# Patient Record
Sex: MALE | Race: BLACK OR AFRICAN AMERICAN | URBAN - METROPOLITAN AREA
[De-identification: names, ages, dates, MRNs, and addresses within clinical notes are randomized per-mention and may not be internally consistent; named-entity substitution may affect disease eponyms.]

---

## 2024-09-18 ENCOUNTER — ANESTHESIA EVENT (OUTPATIENT)
Dept: SURGERY | Facility: HOSPITAL | Age: 64
End: 2024-09-18
Payer: MEDICARE

## 2024-09-19 ENCOUNTER — ANESTHESIA (OUTPATIENT)
Dept: SURGERY | Facility: HOSPITAL | Age: 64
End: 2024-09-19
Payer: MEDICARE

## 2024-09-19 NOTE — ANESTHESIA PREPROCEDURE EVALUATION
PRE-OP EVALUATION    Patient Name: Que Shaikh    Admit Diagnosis: VOICE HOARSENESS; HEAVY SMOKER; VOCAL CORD MASS    Pre-op Diagnosis: VOICE HOARSENESS; HEAVY SMOKER; VOCAL CORD MASS    MICRODIRECT LARYNGOSCOPY WITH BIOPSY    Anesthesia Procedure: MICRODIRECT LARYNGOSCOPY WITH BIOPSY (Bilateral)    Surgeons and Role:     * Darron Colbert MD - Primary    Pre-op vitals reviewed.        Body mass index is 24.74 kg/m².    Current medications reviewed.  Hospital Medications:  No current facility-administered medications on file as of .       Outpatient Medications:     No medications prior to admission.       Allergies: Patient has no known allergies.      Anesthesia Evaluation        Anesthetic Complications           GI/Hepatic/Renal                                 Cardiovascular  Comment: Cardiac stress test 08/2024:  Impressions:   - There is no evidence of myocardial ischemia.   - Normal ejection fraction.   - Normal EKG study after maximal exercise.   - Study findings suggest moderate FIXED coronary artery disease,     predominantly involving the RCA. OR due to diaphragm attenuation.   Prepared and signed by   Atul Cartagena MD   08/28/2024 14:44       ECG reviewed.  Exercise tolerance: good              (+) hyperlipidemia                                  Endo/Other    Negative endo/other ROS.                              Pulmonary  Comment: Emphysema on CT scan; reviewed pulm note, no indication for active treatment at this time  50 pack year smoking, active smoker    Negative pulmonary ROS.                       Neuro/Psych                                      History reviewed. No pertinent surgical history.  Social History     Tobacco Use    Smoking status: Every Day     Types: Cigarettes    Smokeless tobacco: Never   Vaping Use    Vaping status: Never Used   Substance and Sexual Activity    Alcohol use: Yes     Comment: Occasional    Drug use: Not Currently     History   Drug Use Unknown     Available pre-op  labs reviewed.               Airway      Mallampati: II  Mouth opening: 3 FB  TM distance: 4 - 6 cm  Neck ROM: full Cardiovascular    Cardiovascular exam normal.         Dental    Dentition appears grossly intact         Pulmonary    Pulmonary exam normal.                 Other findings              ASA: 2   Plan: general  NPO status verified and patient meets guidelines.          Plan/risks discussed with: patient                Present on Admission:  **None**

## 2024-09-24 RX ORDER — ATORVASTATIN CALCIUM 20 MG/1
20 TABLET, FILM COATED ORAL NIGHTLY
COMMUNITY

## 2024-09-25 ENCOUNTER — HOSPITAL ENCOUNTER (OUTPATIENT)
Facility: HOSPITAL | Age: 64
Setting detail: HOSPITAL OUTPATIENT SURGERY
Discharge: HOME | End: 2024-09-25
Attending: OTOLARYNGOLOGY | Admitting: OTOLARYNGOLOGY
Payer: MEDICARE

## 2024-09-25 VITALS
WEIGHT: 166 LBS | HEART RATE: 72 BPM | BODY MASS INDEX: 24.04 KG/M2 | HEIGHT: 69.5 IN | DIASTOLIC BLOOD PRESSURE: 89 MMHG | OXYGEN SATURATION: 99 % | TEMPERATURE: 98 F | SYSTOLIC BLOOD PRESSURE: 136 MMHG | RESPIRATION RATE: 16 BRPM

## 2024-09-25 PROCEDURE — 88365 INSITU HYBRIDIZATION (FISH): CPT | Performed by: OTOLARYNGOLOGY

## 2024-09-25 PROCEDURE — 87176 TISSUE HOMOGENIZATION CULTR: CPT | Performed by: OTOLARYNGOLOGY

## 2024-09-25 PROCEDURE — 87206 SMEAR FLUORESCENT/ACID STAI: CPT | Performed by: OTOLARYNGOLOGY

## 2024-09-25 PROCEDURE — 0CBV8ZX EXCISION OF LEFT VOCAL CORD, VIA NATURAL OR ARTIFICIAL OPENING ENDOSCOPIC, DIAGNOSTIC: ICD-10-PCS | Performed by: OTOLARYNGOLOGY

## 2024-09-25 PROCEDURE — 88341 IMHCHEM/IMCYTCHM EA ADD ANTB: CPT | Performed by: OTOLARYNGOLOGY

## 2024-09-25 PROCEDURE — 0CBT8ZX EXCISION OF RIGHT VOCAL CORD, VIA NATURAL OR ARTIFICIAL OPENING ENDOSCOPIC, DIAGNOSTIC: ICD-10-PCS | Performed by: OTOLARYNGOLOGY

## 2024-09-25 PROCEDURE — 87185 SC STD ENZYME DETCJ PER NZM: CPT | Performed by: OTOLARYNGOLOGY

## 2024-09-25 PROCEDURE — 87070 CULTURE OTHR SPECIMN AEROBIC: CPT | Performed by: OTOLARYNGOLOGY

## 2024-09-25 PROCEDURE — 88342 IMHCHEM/IMCYTCHM 1ST ANTB: CPT | Performed by: OTOLARYNGOLOGY

## 2024-09-25 PROCEDURE — 87205 SMEAR GRAM STAIN: CPT | Performed by: OTOLARYNGOLOGY

## 2024-09-25 PROCEDURE — 87102 FUNGUS ISOLATION CULTURE: CPT | Performed by: OTOLARYNGOLOGY

## 2024-09-25 PROCEDURE — 87077 CULTURE AEROBIC IDENTIFY: CPT | Performed by: OTOLARYNGOLOGY

## 2024-09-25 PROCEDURE — 88321 CONSLTJ&REPRT SLD PREP ELSWR: CPT | Performed by: OTOLARYNGOLOGY

## 2024-09-25 PROCEDURE — 88305 TISSUE EXAM BY PATHOLOGIST: CPT | Performed by: OTOLARYNGOLOGY

## 2024-09-25 RX ORDER — LIDOCAINE HYDROCHLORIDE 10 MG/ML
INJECTION, SOLUTION EPIDURAL; INFILTRATION; INTRACAUDAL; PERINEURAL AS NEEDED
Status: DISCONTINUED | OUTPATIENT
Start: 2024-09-25 | End: 2024-09-25 | Stop reason: SURG

## 2024-09-25 RX ORDER — ONDANSETRON 2 MG/ML
INJECTION INTRAMUSCULAR; INTRAVENOUS AS NEEDED
Status: DISCONTINUED | OUTPATIENT
Start: 2024-09-25 | End: 2024-09-25 | Stop reason: SURG

## 2024-09-25 RX ORDER — NEOSTIGMINE METHYLSULFATE 1 MG/ML
INJECTION INTRAVENOUS AS NEEDED
Status: DISCONTINUED | OUTPATIENT
Start: 2024-09-25 | End: 2024-09-25 | Stop reason: SURG

## 2024-09-25 RX ORDER — ESMOLOL HYDROCHLORIDE 10 MG/ML
INJECTION INTRAVENOUS AS NEEDED
Status: DISCONTINUED | OUTPATIENT
Start: 2024-09-25 | End: 2024-09-25 | Stop reason: SURG

## 2024-09-25 RX ORDER — LABETALOL HYDROCHLORIDE 5 MG/ML
5 INJECTION, SOLUTION INTRAVENOUS EVERY 5 MIN PRN
Status: DISCONTINUED | OUTPATIENT
Start: 2024-09-25 | End: 2024-09-25

## 2024-09-25 RX ORDER — HYDROCODONE BITARTRATE AND ACETAMINOPHEN 5; 325 MG/1; MG/1
1 TABLET ORAL ONCE AS NEEDED
Status: DISCONTINUED | OUTPATIENT
Start: 2024-09-25 | End: 2024-09-25

## 2024-09-25 RX ORDER — HYDROCODONE BITARTRATE AND ACETAMINOPHEN 5; 325 MG/1; MG/1
2 TABLET ORAL ONCE AS NEEDED
Status: DISCONTINUED | OUTPATIENT
Start: 2024-09-25 | End: 2024-09-25

## 2024-09-25 RX ORDER — GLYCOPYRROLATE 0.2 MG/ML
INJECTION, SOLUTION INTRAMUSCULAR; INTRAVENOUS AS NEEDED
Status: DISCONTINUED | OUTPATIENT
Start: 2024-09-25 | End: 2024-09-25 | Stop reason: SURG

## 2024-09-25 RX ORDER — ONDANSETRON 2 MG/ML
4 INJECTION INTRAMUSCULAR; INTRAVENOUS EVERY 6 HOURS PRN
Status: DISCONTINUED | OUTPATIENT
Start: 2024-09-25 | End: 2024-09-25

## 2024-09-25 RX ORDER — LIDOCAINE HYDROCHLORIDE 40 MG/ML
SOLUTION TOPICAL AS NEEDED
Status: DISCONTINUED | OUTPATIENT
Start: 2024-09-25 | End: 2024-09-25 | Stop reason: SURG

## 2024-09-25 RX ORDER — ACETAMINOPHEN 500 MG
1000 TABLET ORAL ONCE AS NEEDED
Status: DISCONTINUED | OUTPATIENT
Start: 2024-09-25 | End: 2024-09-25

## 2024-09-25 RX ORDER — NALOXONE HYDROCHLORIDE 0.4 MG/ML
0.08 INJECTION, SOLUTION INTRAMUSCULAR; INTRAVENOUS; SUBCUTANEOUS AS NEEDED
Status: DISCONTINUED | OUTPATIENT
Start: 2024-09-25 | End: 2024-09-25

## 2024-09-25 RX ORDER — DEXAMETHASONE SODIUM PHOSPHATE 4 MG/ML
VIAL (ML) INJECTION AS NEEDED
Status: DISCONTINUED | OUTPATIENT
Start: 2024-09-25 | End: 2024-09-25 | Stop reason: SURG

## 2024-09-25 RX ORDER — SODIUM CHLORIDE, SODIUM LACTATE, POTASSIUM CHLORIDE, CALCIUM CHLORIDE 600; 310; 30; 20 MG/100ML; MG/100ML; MG/100ML; MG/100ML
INJECTION, SOLUTION INTRAVENOUS CONTINUOUS
Status: DISCONTINUED | OUTPATIENT
Start: 2024-09-25 | End: 2024-09-25

## 2024-09-25 RX ORDER — HYDROMORPHONE HYDROCHLORIDE 1 MG/ML
0.6 INJECTION, SOLUTION INTRAMUSCULAR; INTRAVENOUS; SUBCUTANEOUS EVERY 5 MIN PRN
Status: DISCONTINUED | OUTPATIENT
Start: 2024-09-25 | End: 2024-09-25

## 2024-09-25 RX ORDER — SCOLOPAMINE TRANSDERMAL SYSTEM 1 MG/1
1 PATCH, EXTENDED RELEASE TRANSDERMAL ONCE
Status: DISCONTINUED | OUTPATIENT
Start: 2024-09-25 | End: 2024-09-25 | Stop reason: HOSPADM

## 2024-09-25 RX ORDER — ROCURONIUM BROMIDE 10 MG/ML
INJECTION, SOLUTION INTRAVENOUS AS NEEDED
Status: DISCONTINUED | OUTPATIENT
Start: 2024-09-25 | End: 2024-09-25 | Stop reason: SURG

## 2024-09-25 RX ORDER — HYDROMORPHONE HYDROCHLORIDE 1 MG/ML
0.4 INJECTION, SOLUTION INTRAMUSCULAR; INTRAVENOUS; SUBCUTANEOUS EVERY 5 MIN PRN
Status: DISCONTINUED | OUTPATIENT
Start: 2024-09-25 | End: 2024-09-25

## 2024-09-25 RX ORDER — METOCLOPRAMIDE HYDROCHLORIDE 5 MG/ML
10 INJECTION INTRAMUSCULAR; INTRAVENOUS EVERY 8 HOURS PRN
Status: DISCONTINUED | OUTPATIENT
Start: 2024-09-25 | End: 2024-09-25

## 2024-09-25 RX ORDER — ACETAMINOPHEN 500 MG
1000 TABLET ORAL ONCE
Status: DISCONTINUED | OUTPATIENT
Start: 2024-09-25 | End: 2024-09-25 | Stop reason: HOSPADM

## 2024-09-25 RX ORDER — OXYMETAZOLINE HYDROCHLORIDE 0.05 G/100ML
SPRAY NASAL AS NEEDED
Status: DISCONTINUED | OUTPATIENT
Start: 2024-09-25 | End: 2024-09-25 | Stop reason: HOSPADM

## 2024-09-25 RX ORDER — HYDROMORPHONE HYDROCHLORIDE 1 MG/ML
0.2 INJECTION, SOLUTION INTRAMUSCULAR; INTRAVENOUS; SUBCUTANEOUS EVERY 5 MIN PRN
Status: DISCONTINUED | OUTPATIENT
Start: 2024-09-25 | End: 2024-09-25

## 2024-09-25 RX ADMIN — SODIUM CHLORIDE, SODIUM LACTATE, POTASSIUM CHLORIDE, CALCIUM CHLORIDE: 600; 310; 30; 20 INJECTION, SOLUTION INTRAVENOUS at 11:55:00

## 2024-09-25 RX ADMIN — LIDOCAINE HYDROCHLORIDE 50 MG: 10 INJECTION, SOLUTION EPIDURAL; INFILTRATION; INTRACAUDAL; PERINEURAL at 11:59:00

## 2024-09-25 RX ADMIN — SODIUM CHLORIDE, SODIUM LACTATE, POTASSIUM CHLORIDE, CALCIUM CHLORIDE: 600; 310; 30; 20 INJECTION, SOLUTION INTRAVENOUS at 12:43:00

## 2024-09-25 RX ADMIN — LIDOCAINE HYDROCHLORIDE 4 ML: 40 SOLUTION TOPICAL at 12:35:00

## 2024-09-25 RX ADMIN — ROCURONIUM BROMIDE 30 MG: 10 INJECTION, SOLUTION INTRAVENOUS at 11:59:00

## 2024-09-25 RX ADMIN — NEOSTIGMINE METHYLSULFATE 4 MG: 1 INJECTION INTRAVENOUS at 12:33:00

## 2024-09-25 RX ADMIN — GLYCOPYRROLATE 0.6 MG: 0.2 INJECTION, SOLUTION INTRAMUSCULAR; INTRAVENOUS at 12:33:00

## 2024-09-25 RX ADMIN — ONDANSETRON 4 MG: 2 INJECTION INTRAMUSCULAR; INTRAVENOUS at 12:04:00

## 2024-09-25 RX ADMIN — DEXAMETHASONE SODIUM PHOSPHATE 8 MG: 4 MG/ML VIAL (ML) INJECTION at 12:04:00

## 2024-09-25 RX ADMIN — ESMOLOL HYDROCHLORIDE 30 MG: 10 INJECTION INTRAVENOUS at 12:07:00

## 2024-09-25 NOTE — DISCHARGE INSTRUCTIONS
Instructions after Vocal Cord Surgery  Darron Colbert MD   [phone]  Recovery  Within the first day after surgery, it is common to have a small amount of blood streaks in your saliva when coughing. This should resolve in the first 1-2 days. Your voice will likely be hoarse and even more so than prior to surgery. This is also normal. Please pay attention to your breathing. If you have any wheezing, chest tightness, shortness of breath or air hunger, please go to the emergency room. Please rest your voice for 72 hours. Use voice only when necessary and please do so quietly (within arm's distance) when talking is unavoidable.    Diet  Start with clear liquids.  You may advance your diet as tolerated.  You can eat and drink whatever you like.      Activity   Initially, start with light activity only.   No travel or long trips for two weeks after surgery.  Heavy lifting, straining and should be avoided during the first two weeks.  Thereafter, as you improve, you may gradually increase your level of activity.  You may return to work or school when you feel better. Do not be surprised if you tire more easily than usual; that is your body’s way of telling you that you should slow down.        Medications  Resume the medications you were taking prior to surgery. Pain following surgery is usually mild and readily controlled with over-the-counter medication such as Tylenol (acetaminophen).  Do not be afraid to take a pain pill if you are uncomfortable, especially when going to bed at night or awakening in the morning.      Concerns  Bleeding.  A small amount of bleeding may be normal.  Please call the office or come to the Emergency Room should you develop brisk, new bleeding, which does not stop after a few minutes of holding pressure.  Fever.  Any temperature above 101 degrees should be treated with acetaminophen (Tylenol).  If the temperature does not respond to Tylenol, please call the office.   Redness.  If  the area becomes red and warm to touch or wound edges separate or the sutures dislodge, please call the office.  Swelling and Pain. Some swelling is expected even 24 to 48 hours after surgery, If swelling increases after that time or if you experience increasing pain, please call the office    Follow Up  Any pathology reports are typically available by a week after your surgery. Please call the office at [phone] to schedule an appointment with Dr. Colbert in 1 week, if not already arranged.       Darron Colbert MD  Otolaryngology - Head & Neck Surgery  [facility]  [phone]

## 2024-09-25 NOTE — ANESTHESIA POSTPROCEDURE EVALUATION
[facility]    Que Shaikh  Patient Status:  Hospital Outpatient Surgery   Age/Gender 64 year old male MRN [id number]   Location [facility] POST ANESTHESIA CARE UNIT Attending Darron Colbert MD   Hosp Day # 0 PCP Aura Murray DO       Anesthesia Post-op Note    BILATERAL MICRODIRECT LARYNGOSCOPY WITH BIOPSY    Procedure Summary       Date: 09/25/24 Room / Location:  MAIN OR 06 / [facility] MAIN OR    Anesthesia Start: 1155 Anesthesia Stop: 1243    Procedure: BILATERAL MICRODIRECT LARYNGOSCOPY WITH BIOPSY (Bilateral) Diagnosis: (VOICE HOARSENESS; HEAVY SMOKER; VOCAL CORD MASS)    Surgeons: Darron Colbert MD Anesthesiologist: Vj Bro MD    Anesthesia Type: general ASA Status: 2            Anesthesia Type: general    Vitals Value Taken Time   /82 09/25/24 1243   Temp 97.4 09/25/24 1243   Pulse 82 09/25/24 1243   Resp 12 09/25/24 1243   SpO2 99 09/25/24 1243       Patient Location: PACU    Anesthesia Type: general    Airway Patency: patent and extubated    Postop Pain Control: adequate    Mental Status: preanesthetic baseline    Nausea/Vomiting: none    Cardiopulmonary/Hydration status: stable euvolemic    Complications: no apparent anesthesia related complications    Postop vital signs: stable    Dental Exam: Unchanged from Preop    Patient to be discharged from PACU when criteria met.

## 2024-09-25 NOTE — OPERATIVE REPORT
PATIENT NAME: Que Shaikh Jr.   MRN: [id number]   DATE OF OPERATION: 9/25/2024   [facility]    PREOPERATIVE DIAGNOSIS:    1. Bilateral true vocal cord lesion   2. Hoarseness   3. History of tobacco use  POSTOPERATIVE DIAGNOSIS:   Same  PROCEDURE:    1. Direct microlaryngoscopy with biopsy of bilateral true vocal cord lesion (CPT 76688)    SURGEON: Darron Colbert MD     ASSISTANT: None.     ANESTHESIA: General.     INDICATION: The patient is a 64 year old male who presents with the above diagnoses. Discussions were held with the patient regarding treatment options, including observation or surgery. The patient decided to go ahead with the procedure, giving written consent. he was eager to proceed.     FINDINGS: Bilateral edema and irregular true vocal cord mucosa, R>L. Extension to subglottic surface of true vocal cords bilaterally    DESCRIPTION OF PROCEDURE: The patient was identified in the preoperative holding area and again in the operating room. The patient was moved from the stretcher to the operating room table and turned over to Anesthesia for sedation and intubation. The patient was sedated and intubated without complication. A time-out was performed confirming the patient's name, age, date of birth, procedure, and allergies. The patient was then turned 90 degrees and turned over to the surgeon for the procedure.     Attention was turned to the direct laryngoscopy with biopsy. Oral and oropharyngeal palpation was performed.  A mouthguard was placed and a Dedo laryngoscope was introduced into the oral cavity. The right oral cavity, retromolar trigone, and oropharynx were examined. The laryngoscope was then repositioned and the right pharynx and pyriform sinus were evaluated. The scope was moved from right to left examining the base-of-tongue and vallecula, left pyriform, left pharynx, oropharynx, retromolar trigone and oral cavity. The scope was then re-introduced in the midline and the posterior  pharyngeal wall, arytenoids, false and true vocal cords, epiglottis, vallecula and the midline base-of-tongue. Finally it was positioned with a view of the bilateral vocal cord lesion and secured in place. Cupped forceps were used to take multiple biopsies of the lesion. Oxymetazoline pledgets were placed for hemostasis. Once hemostasis was achieved the pledgets and laryngoscope were removed.     The patient was then turned back 90 degrees and turned over to the anesthesiologist for wake-up. The patient woke up without complications. he was transferred from the operating room table to the stretcher and from the stretcher to the PACU in stable condition.     SPECIMENS:    1. Right true vocal cord lesion (sent for histology and tissue culture)   2. Left true vocal cord lesion    ESTIMATED BLOOD LOSS: minimal.     COMPLICATIONS: None.     PLAN: Patient will take pain medication as needed and follow up in 1 week for path results        Darron Colbert MD  Otolaryngology - Head & Neck Surgery  [facility]  [phone]

## 2024-09-25 NOTE — ANESTHESIA PROCEDURE NOTES
Airway  Date/Time: 9/25/2024 12:01 PM  Urgency: elective    Airway not difficult    General Information and Staff    Patient location during procedure: OR  Anesthesiologist: Vj Bro MD  Resident/CRNA: Joe Mckinley CRNA  Performed: CRNA   Performed by: Joe Mckinley CRNA  Authorized by: Vj Bro MD      Indications and Patient Condition  Indications for airway management: anesthesia  Spontaneous Ventilation: absent  Sedation level: deep  Preoxygenated: yes  Patient position: sniffing  MILS maintained throughout  Mask difficulty assessment: 1 - vent by mask  Planned trial extubation    Final Airway Details  Final airway type: endotracheal airway      Successful airway: ETT  Cuffed: yes   Successful intubation technique: direct laryngoscopy  Facilitating devices/methods: intubating stylet  Endotracheal tube insertion site: oral  Blade: Norman  Blade size: #4  ETT size (mm): 6.0    Cormack-Lehane Classification: grade I - full view of glottis  Placement verified by: capnometry   Cuff volume (mL): 6  Measured from: lips  ETT to lips (cm): 23  Number of attempts at approach: 1  Number of other approaches attempted: 0    Additional Comments  Dentition per pre op

## 2024-10-25 ENCOUNTER — ORDER TRANSCRIPTION (OUTPATIENT)
Dept: PHYSICAL THERAPY | Facility: HOSPITAL | Age: 64
End: 2024-10-25

## 2024-10-25 DIAGNOSIS — C32.0 VOCAL CORD CANCER (HCC): Primary | ICD-10-CM

## 2024-11-07 RX ORDER — BUPROPION HYDROCHLORIDE 150 MG/1
150 TABLET ORAL DAILY
Status: ON HOLD | COMMUNITY
End: 2024-11-20

## 2024-11-07 RX ORDER — DEXAMETHASONE 4 MG/1
4 TABLET ORAL
COMMUNITY

## 2024-11-07 RX ORDER — PROCHLORPERAZINE MALEATE 5 MG/1
10 TABLET ORAL EVERY 6 HOURS PRN
COMMUNITY

## 2024-11-07 RX ORDER — ACETAMINOPHEN 325 MG/1
325 TABLET ORAL EVERY 6 HOURS PRN
COMMUNITY

## 2024-11-07 RX ORDER — ONDANSETRON 8 MG/1
8 TABLET, FILM COATED ORAL EVERY 8 HOURS PRN
COMMUNITY

## 2024-11-20 ENCOUNTER — ANESTHESIA (OUTPATIENT)
Dept: ENDOSCOPY | Facility: HOSPITAL | Age: 64
End: 2024-11-20
Payer: MEDICARE

## 2024-11-20 ENCOUNTER — ANESTHESIA EVENT (OUTPATIENT)
Dept: ENDOSCOPY | Facility: HOSPITAL | Age: 64
End: 2024-11-20
Payer: MEDICARE

## 2024-11-20 ENCOUNTER — HOSPITAL ENCOUNTER (OUTPATIENT)
Facility: HOSPITAL | Age: 64
Setting detail: HOSPITAL OUTPATIENT SURGERY
Discharge: HOME | End: 2024-11-20
Attending: INTERNAL MEDICINE | Admitting: INTERNAL MEDICINE
Payer: MEDICARE

## 2024-11-20 VITALS
HEIGHT: 69.5 IN | WEIGHT: 166 LBS | BODY MASS INDEX: 24.04 KG/M2 | DIASTOLIC BLOOD PRESSURE: 84 MMHG | SYSTOLIC BLOOD PRESSURE: 125 MMHG | HEART RATE: 77 BPM | OXYGEN SATURATION: 99 % | TEMPERATURE: 98 F | RESPIRATION RATE: 18 BRPM

## 2024-11-20 PROCEDURE — 0DH63UZ INSERTION OF FEEDING DEVICE INTO STOMACH, PERCUTANEOUS APPROACH: ICD-10-PCS | Performed by: INTERNAL MEDICINE

## 2024-11-20 DEVICE — PERCUTANEOUS ENDOSCOPIC GASTROSTOMY KIT ENFIT
Type: IMPLANTABLE DEVICE | Status: FUNCTIONAL
Brand: ENDOVIVE SAFETY PEG KIT

## 2024-11-20 RX ORDER — SODIUM CHLORIDE, SODIUM LACTATE, POTASSIUM CHLORIDE, CALCIUM CHLORIDE 600; 310; 30; 20 MG/100ML; MG/100ML; MG/100ML; MG/100ML
INJECTION, SOLUTION INTRAVENOUS CONTINUOUS
Status: DISCONTINUED | OUTPATIENT
Start: 2024-11-20 | End: 2024-11-20

## 2024-11-20 RX ORDER — NALOXONE HYDROCHLORIDE 0.4 MG/ML
0.08 INJECTION, SOLUTION INTRAMUSCULAR; INTRAVENOUS; SUBCUTANEOUS ONCE AS NEEDED
Status: DISCONTINUED | OUTPATIENT
Start: 2024-11-20 | End: 2024-11-20

## 2024-11-20 NOTE — ANESTHESIA POSTPROCEDURE EVALUATION
[facility]    Que Shaikh . Patient Status:  Hospital Outpatient Surgery   Age/Gender 64 year old male MRN [id number]   Location [facility] ENDOSCOPY PAIN CENTER Attending Asad Solis DO   Hosp Day # 0 PCP Aura Murray DO       Anesthesia Post-op Note    ESOPHAGOGASTRODUODENOSCOPY (EGD) WITH PERCUTANEOUS ENDOSCOPIC GASTROSTOMY TUBE PLACEMENT    Procedure Summary       Date: 11/20/24 Room / Location:  ENDOSCOPY 03 /  ENDOSCOPY    Anesthesia Start: 1454 Anesthesia Stop:     Procedures:       ESOPHAGOGASTRODUODENOSCOPY (EGD) WITH PERCUTANEOUS ENDOSCOPIC GASTROSTOMY TUBE PLACEMENT      PERCUTANEOUS ENDOSCOPIC GASTROSTOMY TUBE PLACEMENT Diagnosis: (PEG PLACEMENT)    Surgeons: Asad Solis DO Anesthesiologist: Brendan Bonner DO    Anesthesia Type: MAC ASA Status: 3            Anesthesia Type: No value filed.    Vitals Value Taken Time   /64 11/20/24 1525   Temp  11/20/24 1525   Pulse 72 11/20/24 1525   Resp 16 11/20/24 1525   SpO2 97 11/20/24 1525       Patient Location: PACU    Anesthesia Type: general and MAC    Airway Patency: patent    Postop Pain Control: adequate    Mental Status: mildly sedated but able to meaningfully participate in the post-anesthesia evaluation    Nausea/Vomiting: none    Cardiopulmonary/Hydration status: stable euvolemic    Complications: no apparent anesthesia related complications    Postop vital signs: stable    Dental Exam: Unchanged from Preop    Patient to be discharged home when criteria met.           No

## 2024-11-20 NOTE — H&P
[facility]   part of [facility]    Que Shaikh Jr. Patient Status:  Hospital Outpatient Surgery    1960 MRN [id number]   Location [facility] ENDOSCOPY PAIN CENTER Attending Asad Solis DO   Hosp Day # 0 PCP Aura Murray DO     Active Problems:    * No active hospital problems. *      Que Shaikh Jr. is a 64 year old male.    Allergies: Allergies[1]    Past Medical History:    Back problem    Cancer (HCC)    THROAT    High cholesterol    Problems with swallowing    THROAT SORE    Visual impairment    READING GLASSES       Pulse 105, temperature 98.1 °F (36.7 °C), temperature source Temporal, resp. rate 18, height 5' 9.5\" (1.765 m), weight 166 lb (75.3 kg), SpO2 98%.    HPI    Review of Systems  Cardiovascular and respiratory review of systems are negative, except as noted in HPI.  The patient denies any fevers or chills.  No new neurologic complaints are noted. Ten point review of systems has been performed and is otherwise negative and/or non-contributory, except as described in HPI.     Physical Exam  Cons: well developed, well nourished.   Head/face:  no trauma, normocephalic.  Eyes:conjunctiva not injected, no scleral icterus.   ENT: mucosa pink and moist.   Neck: no JVD.   Resp: normal rate, clear to auscultation.  GI: soft, non-tender;bowel sounds present.   MS: normal gait and ROM.  Skin: no rashes,lesions.   Extrem: no clubbing, no cyanosis  Neuro/Psych: alert and oriented. Normal affect.      Assessment:  PEG tube placement today     Asad Solis DO  2024       [1] No Known Allergies     · Due to pulmonary edema and volume overload  · Continue diuresis - better  · Minimize fluids  · O2 to keep sats > 90%  · Prn albuterol  · Check weight daily

## 2024-11-20 NOTE — OPERATIVE REPORT
EGD Operative Report    Que Shaikh  Patient Status:  [facility] Outpatient Surgery    1960 MRN [id number]   Location [facility] ENDOSCOPY PAIN CENTER Attending Asad Solis DO   Hosp Day #   0 PCP Aura Murray DO       Pre-op Diagnosis: ESOPHAGEAL DYSPHAGIA HEAD AND NECK CANCER     Post-Op Diagnosis:   Post-Op Diagnosis:                ESOPHAGUS:  Normal esophagus                STOMACH:  Moderate diffuse erosive gastritis. 20 [place] PEG tube was successfully placed in gastric body                  DUODENUM:  Moderate diffuse erosive duodenitis      Procedure Performed: EGD      Informed Consent: Informed consent for both the procedure and sedation were obtained from the patient. The potentially life-threatening complications of sedation, bleeding,  Perforation, transfusion or repeat endoscopy were reviewed along with the possible need for hospitalization, surgical management, transfusion or repeat endoscopy should one of these complications arise. The patient understands and is agreeable to proceed.  Sedation Type: MAC-Patient received sedation with monitored anesthesia provided by an anesthesiologist  Moderate Sedation Time: None.  Deep sedation provided by anesthesia.  Procedure Description:  Using light reflex and external abdominal pressure an appropriate spot in the left upper quadrant was identified.  The abdominal wall was prepared in a sterile fashion.  The area was then anesthetized using local anesthetic.  I then made a small incision in the abdominal wall.  A trochar was then inserted through the abdominal wall and punctured successfully into the gastric lumen.  A wire was then placed though the needle and grasped with the snare and withdrawn through the mouth.  The PEG tube was then attached to the wire and placed in the typical pull  fashion.  The abdominal wall was then cleaned.  External bumper was placed about .      The endoscope was then reinserted in through the mouth and into the stomach to confirm adequate placement of the internal bumper in the gastric lumen.   The procedure was then ended.      Findings:   Moderate diffuse erosive gastritis. 20 [place] PEG tube was successfully placed in gastric body  Recommendations:   Ok to start using PEG tube   Pantoprazole 40 mg twice daily for 12 weeks and then 40 mg daily   Avoid NSAID's       Discharge:  The patient was given an after visit summary detailing the procedure, findings, recommendations and follow up plans.  Asad Solis DO  11/20/2024  3:21 PM

## 2024-11-20 NOTE — ANESTHESIA PREPROCEDURE EVALUATION
PRE-OP EVALUATION    Patient Name: Que Shaikh Jr.    Admit Diagnosis: ESOPHAGEAL DYSPHAGIA HEAD AND NECK CANCER    Pre-op Diagnosis: ESOPHAGEAL DYSPHAGIA HEAD AND NECK CANCER    ESOPHAGOGASTRODUODENOSCOPY (EGD) WITH PERCUTANEOUS ENDOSCOPIC GASTROSTOMY TUBE PLACEMENT    Anesthesia Procedure: ESOPHAGOGASTRODUODENOSCOPY (EGD) WITH PERCUTANEOUS ENDOSCOPIC GASTROSTOMY TUBE PLACEMENT  PERCUTANEOUS ENDOSCOPIC GASTROSTOMY TUBE PLACEMENT    Surgeons and Role:     * Asad Solis, DO - Primary    Pre-op vitals reviewed.  Temp: 98.1 °F (36.7 °C)  Pulse: 105  Resp: 18  SpO2: 98 %  Body mass index is 24.16 kg/m².    Current medications reviewed.  Hospital Medications:   lactated ringers infusion   Intravenous Continuous    ceFAZolin (Ancef) 2g in 10mL IV syringe premix  2 g Intravenous Once    ceFAZolin (Ancef) 2 g/10mL IV syringe premix           Outpatient Medications:   Prescriptions Prior to Admission[1]    Allergies: Patient has no known allergies.      Anesthesia Evaluation    Patient summary reviewed.    Anesthetic Complications  (-) history of anesthetic complications         GI/Hepatic/Renal    Negative GI/hepatic/renal ROS.                             Cardiovascular    Negative cardiovascular ROS.  ECG reviewed.  Exercise tolerance: good     MET: >4                                           Endo/Other    Negative endo/other ROS.                              Pulmonary    Negative pulmonary ROS.                       Neuro/Psych    Negative neuro/psych ROS.                           squamous cell carcinoma of the larynx,         Past Surgical History:   Procedure Laterality Date    Foreign body removal Right 2015    LOWER LEG    Rotator cuff repair Bilateral 2014    X3     Social History     Socioeconomic History    Marital status: Single   Tobacco Use    Smoking status: Every Day     Current packs/day: 1.00     Types: Cigarettes    Smokeless tobacco: Never   Vaping Use    Vaping status: Never Used   Substance  and Sexual Activity    Alcohol use: Yes     Comment: Occasional    Drug use: Not Currently     History   Drug Use Unknown     Available pre-op labs reviewed.               Airway      Mallampati: I  Mouth opening: >3 FB  TM distance: 4 - 6 cm  Neck ROM: full Cardiovascular    Cardiovascular exam normal.  Rhythm: regular  Rate: normal  (-) murmur   Dental             Pulmonary    Pulmonary exam normal.  Breath sounds clear to auscultation bilaterally.               Other findings              ASA: 3   Plan: MAC  NPO status verified and patient meets guidelines.  Patient has not taken beta blockers in last 24 hours.  Post-procedure pain management plan discussed with surgeon and patient.      Plan/risks discussed with: patient                Present on Admission:  **None**             [1]   Medications Prior to Admission   Medication Sig Dispense Refill Last Dose/Taking    prochlorperazine 5 MG Oral Take 2 tablets (10 mg total) by mouth every 6 (six) hours as needed for Nausea.   Taking As Needed    dexamethasone (DECADRON) 4 MG tablet Take 1 tablet (4 mg total) by mouth.   11/20/2024 Morning    ondansetron (ZOFRAN) 8 MG tablet Take 1 tablet (8 mg total) by mouth every 8 (eight) hours as needed for Nausea.   11/20/2024 Morning    acetaminophen 325 MG Oral Tab Take 1 tablet (325 mg total) by mouth every 6 (six) hours as needed for Pain.   11/19/2024    atorvastatin 20 MG Oral Tab Take 1 tablet (20 mg total) by mouth nightly.   Taking

## 2024-11-20 NOTE — DISCHARGE INSTRUCTIONS
Home Care Instructions Gastroscopy with Sedation    Diet:  - Resume your regular diet as tolerated unless otherwise instructed.  - Start with light meals to minimize bloating.  - Do not drink alcohol today.    Medication:  - If you have questions about resuming your normal medications, please contact your Primary Care Physician.    Activities:  - Take it easy today. Do not return to work today.  - Do not drive today.  - Do not operate any machinery today (including kitchen equipment).    Gastroscopy:  - You may have a sore throat for 2-3 days following the exam. This is normal. Gargling with warm salt water (1/2 tsp salt to 1 glass warm water) or using throat lozenges will help.  - If you experience any sharp pain in your neck, abdomen or chest, vomiting of blood, oral temperature over 100 degrees Fahrenheit, light-headedness or dizziness, or any other problems, contact your doctor.    **If unable to reach your doctor, please go to the [facility] Emergency Room**    - Your referring physician will receive a full report of your examination.  - If you do not hear from your doctor's office within two weeks of your biopsy, please call them for your results.          Gastrostomy Tube Feeding   The gastrostomy tube (PEG) is a short feeding tube that goes directly into your stomach for nutritional support and medication delivery.     Supplies you will need:   -60cc syringe   -Liquid food ( as recommended by your dietician or physician)   -IV pole or wall hook to hang the food container while receiving the feeding    The Tube Feeding:   Attach a clean 60cc syringe to the end of your feeding tube.     -Pull back on the plunger. You should see some gastric juices (yellow-green fluid). This is stomach content s and it tells you the tube is in your stomach.   -If you pull back more than 60 cc of fluid, do not give yourself food. Push back in the stomach contents, which contains important minerals, back into the tube.    -Then flush with 20 cc of water. Wait for a few hours and check again. Tell your visiting nurse or doctor if this occurs frequently .    To give your self the feeding:   -Attach a clean 60cc syringe to the end of your feeding without the plunger or attach a feeding tube kit with bag.     If you have a clamped tube:   -Open the clamp slowly to adjust the speed of the feeding.   -Your meal should last 45 minutes to an hour. It is important to sit up or prop your head up while receiving your feeding. If you choke or have difficulty breathing during a feeding, stop and call your doctor immediately.   -When the feeding is done, fill the syringe or bag with 20 cc of water to flush the tube.   -After the water is given, roll the clamp down to turn off and disconnect the syringe     If you do not have a clamped tube:   -Hold the tip of the tube above your waist, open end cap and insert syringe without the plunger tightly into opening.   -Pour your desired tube feeding slowing into the syringe around 20-30 cc.’s . keep the syringe elevated. Allow the feeding to flow easily into your stomach. Refill as directed by your dietary regimen.   -Your meal should last 45 minutes to an hour. It is important to sit up or prop your head up while receiving your feeding. If you choke or have difficulty breathing during a feeding, stop and call your doctor immediately.   -When the feeding is done, fill the syringe with 20 cc of water to flush the tube.   -Wash out the syringe or bag after each with dishwashing liquid and water.Rinse thoroughly and air dry .       Taking your Medication   -Medications can be given through your PEG tube. Use the liquid form of your medication if it is available at your pharmacy. If the liquid form is not available, you must crush your pills.   -If the letters “SR” appear after the drug name on the label, this indicates the medication is “sustained-release.” Do not crush these pills. Check with your  pharmacist or nurse to be sure that your pills may be crushed and given at the same time.     To give your medication, follow these steps:   -Attach a 60 cc syringe to your tube without the plunger.   -Flush your feeding tube with 20cc of water before giving your medication.   -Crush the pills. To crush your pills, place them in a plastic bag, and then use a rolling pin or soup can as a crushing instrument. After you have crushed your pills finely, let the pieces dissolve in warm water (not hot water) so that pieces will not clog your tube.   -Place your medication into the syringe   -Flush the tube with 20cc of water after giving your medication.   -Some medications should be given with food; others on an empty stomach       PEG tube site care   While the site heals, clean around the site daily. Follow these steps:   1. Choose any of these products and moisten it with warm, soapy water:   Cotton-tip applicator or swab   Clean, soft washcloth     2. Cleanse the site using outward circles around the site    3. After cleaning, rinse the area around the site with water and any of the products listed above.     4. Allow skin to air dry.     You may apply antibiotic ointment if your doctor says you may.     Observe your site daily for redness, pain, swelling, or unusual drainage around the tube. If you notice any of these signs, call your doctor.     PEG Tube Care   To prevent a clogged feeding tube, flush your tube with water each time after giving a feeding or medication.   -If your feeding tube becomes clogged, you can use these methods:   -Place the syringe into your feeding tube, and pull back on the plunger.   -Flush your tube with warm tap water.   -You may use a small amount of Coca-Cola to flush the PEG tube   -If you cannot unclog your tube, call your doctor       When to call your doctor   -If you have choking or difficulty breathing during a feeding, stop the feeding and call your Doctor immediately.   -If  you cannot unclog your tube, call your doctor immediately.   -Call your doctor if the following persist: diarrhea, constipation, nausea, or dehydration   -Call your doctor if you have redness, pain, swelling, or unusual drainage at the site.     PEG tube CARE when not in use   If you are currently not using your tube for feedings or medications, flush the tube with 20 cc of water daily     Call your GI physician to discuss discontinuance of PEG tube feeding.

## (undated) DEVICE — VALVE AIR/H20 DEFENDO SCT BX

## (undated) DEVICE — SOLUTION IRRIG 1000ML 0.9% NACL USP BTL

## (undated) DEVICE — 1200CC GUARDIAN II: Brand: GUARDIAN

## (undated) DEVICE — KIT CUSTOM ENDOPROCEDURE STERIS

## (undated) DEVICE — KIT VLV 5 PC AIR H2O SUCT BX ENDOGATOR CONN

## (undated) DEVICE — GLOVE SUR 7.5 SENSICARE PI PIP CRM PWD F

## (undated) DEVICE — 3M™ RED DOT™ MONITORING ELECTRODE WITH FOAM TAPE AND STICKY GEL 2570-3, 3/BAG, 200/CASE, 54/PLT: Brand: RED DOT™

## (undated) DEVICE — BITEBLOCK ENDOSCP 60FR MAXI STRP

## (undated) DEVICE — V2 SPECIMEN COLLECTION MANIFOLD KIT: Brand: NEPTUNE

## (undated) DEVICE — LARYNGOSCOPY: Brand: MEDLINE INDUSTRIES, INC.

## (undated) DEVICE — MICROLARYNGEAL ORAL/NASAL TRACHEAL TUBE CUFFED,MURPHY EYE: Brand: SHILEY

## (undated) DEVICE — SLEEVE COMPR MD KNEE LEN SGL USE KENDALL SCD

## (undated) DEVICE — 10FT COMBINED O2 DELIVERY/CO2 MONITORING. FILTER WITH MICROSTREAM TYPE LUER: Brand: DUAL ADULT NASAL CANNULA